# Patient Record
Sex: MALE | Race: WHITE | NOT HISPANIC OR LATINO
[De-identification: names, ages, dates, MRNs, and addresses within clinical notes are randomized per-mention and may not be internally consistent; named-entity substitution may affect disease eponyms.]

---

## 2017-08-11 ENCOUNTER — TRANSCRIPTION ENCOUNTER (OUTPATIENT)
Age: 42
End: 2017-08-11

## 2018-09-19 ENCOUNTER — TRANSCRIPTION ENCOUNTER (OUTPATIENT)
Age: 43
End: 2018-09-19

## 2022-06-27 ENCOUNTER — NON-APPOINTMENT (OUTPATIENT)
Age: 47
End: 2022-06-27

## 2022-06-27 ENCOUNTER — APPOINTMENT (OUTPATIENT)
Dept: ORTHOPEDIC SURGERY | Facility: CLINIC | Age: 47
End: 2022-06-27
Payer: COMMERCIAL

## 2022-06-27 VITALS — WEIGHT: 238 LBS | HEIGHT: 72 IN | BODY MASS INDEX: 32.23 KG/M2

## 2022-06-27 PROCEDURE — 99204 OFFICE O/P NEW MOD 45 MIN: CPT

## 2022-06-27 PROCEDURE — 72170 X-RAY EXAM OF PELVIS: CPT

## 2022-06-27 PROCEDURE — 72100 X-RAY EXAM L-S SPINE 2/3 VWS: CPT

## 2022-06-27 NOTE — IMAGING
[de-identified] : L spine\par \par Palpation: No midline lumbar tenderness. No tenderness to palpation or spasm in bilateral thoracic and lumbar paraspinal musculature. No SI joint tenderness to palpation. No greater trochanter tenderness to palpation.\par \par ROM: diminished all planes \par \par Strength: 5/5 bilateral hip flexors, knee extensors, ankle dorsiflexors, EHL, ankle plantarflexors \par \par Sensation: Sensation present to light touch bilateral L2-S1 distributions \par \par Provocative maneuvers: Positive bilateral straight leg raise\par

## 2022-06-27 NOTE — HISTORY OF PRESENT ILLNESS
[10] : 10 [Radiating] : radiating [Sharp] : sharp [Shooting] : shooting [Stabbing] : stabbing [Tingling] : tingling [Constant] : constant [Household chores] : household chores [Full time] : Work status: full time [de-identified] : 6/27/[birth year]-48 y/o LHD M presents for right-sided lower back pain X 1 week. Denies specific injury. Aggravated by bending over and  prolonged standing/sitting/lying down. left leg is okay.  No prior episodes.  \par \par Denies b/b incontinence.\par Associated pain throughout RLE to ankle. No N/T/weakness\par \par Has been taking unprescribed oxycodone. \par No prior PT/injections/surgery/acupuncture/chiropractor\par \par PMHx:Denies\par No cancer hx\par \par Xrays today:\par L spine - loss of disc hiegth L4-S1 \par AP PELVIS - negative \par \par Occupation: for [employer]\par \par \par  [] : no [FreeTextEntry7] : R leg [de-identified] : rest, pain meds [de-identified] :

## 2022-06-27 NOTE — PHYSICAL EXAM
[Right lower extremity below knee] : right lower extremity below knee [Right lower extremity above knee] : right lower extremity above knee [] : patient ambulates without assistive device

## 2022-06-27 NOTE — DISCUSSION/SUMMARY
[de-identified] : severe radiculopathy \par indicated for MRi L spine \par fu to review the MRi \par MDP/flexeril\par out of work

## 2022-06-29 ENCOUNTER — FORM ENCOUNTER (OUTPATIENT)
Age: 47
End: 2022-06-29

## 2022-06-30 ENCOUNTER — APPOINTMENT (OUTPATIENT)
Dept: MRI IMAGING | Facility: CLINIC | Age: 47
End: 2022-06-30

## 2022-06-30 PROCEDURE — 72148 MRI LUMBAR SPINE W/O DYE: CPT

## 2022-07-11 ENCOUNTER — APPOINTMENT (OUTPATIENT)
Dept: ORTHOPEDIC SURGERY | Facility: CLINIC | Age: 47
End: 2022-07-11

## 2022-07-11 VITALS — WEIGHT: 238 LBS | BODY MASS INDEX: 32.23 KG/M2 | HEIGHT: 72 IN

## 2022-07-11 DIAGNOSIS — M54.41 LUMBAGO WITH SCIATICA, RIGHT SIDE: ICD-10-CM

## 2022-07-11 PROCEDURE — 99214 OFFICE O/P EST MOD 30 MIN: CPT

## 2022-07-11 NOTE — DISCUSSION/SUMMARY
[de-identified] : reviewed the MRi - L4-5 disc herniation -  rec he try an Christian - consider surgery if not responding to conservative tx \par will add gabapentin

## 2022-07-11 NOTE — HISTORY OF PRESENT ILLNESS
[10] : 10 [Radiating] : radiating [Sharp] : sharp [Shooting] : shooting [Stabbing] : stabbing [Tingling] : tingling [Constant] : constant [Household chores] : household chores [Full time] : Work status: full time [de-identified] : [birth year]-46 y/o LHD M presents for right-sided lower back pain X 1 week. Denies specific injury. Aggravated by bending over and  prolonged standing/sitting/lying down. left leg is okay.  No prior episodes.  \par \par Denies b/b incontinence.\par Associated pain throughout RLE to ankle. No N/T/weakness\par \par Has been taking unprescribed oxycodone. \par No prior PT/injections/surgery/acupuncture/chiropractor\par \par PMHx:Denies\par No cancer hx\par \par Xrays today:\par L spine - loss of disc hiegth L4-S1 \par AP PELVIS - negative \par \par Occupation: for [employer]\par \par 7/11/22: Here for MRI review. plan at last was "severe radiculopathy \par indicated for MRi L spine \par fu to review the MRi \par MDP/flexeril\par out of work" - overall the pain is a bit better but remains in the lower back and down the right - MDP - PT/chiro made it worse - left leg is okay \par \par L-spine MRI 6/30/22\par 1. Slight straightening of lordosis.\par 2. Mixed Modic type 1 and 2 endplate change L4-L5 with Modic type 1 endplate change L5-S1. No fracture.\par 3. L1-L2: Facet hypertrophy and ligamentum flavum hypertrophy.\par 4. L2-L3: Facet hypertrophy and ligamentum flavum hypertrophy.\par 5. L3-L4: Loss of disc signal and height. Broad bulge, facet hypertrophy, and ligamentum flavum hypertrophy\par with foraminal extension of bulges, left foraminal herniation and left foraminal stenosis. Mild-to-moderate \par central stenosis.\par 6. L4-L5: Loss of disc signal and height. Minor retrolisthesis. Broad bulge, facet arthrosis, and ligamentum \par flavum hypertrophy with foraminal stenosis. Broad central herniation impressing on the thecal sac with \par moderate-to-severe central stenosis.\par 7. L5-S1: Broad bulge, facet arthrosis, ligamentum flavum hypertrophy and facet effusion with inferior \par foraminal stenosis. Central herniation impressing on the thecal sac. [] : no [FreeTextEntry7] : R leg [de-identified] : rest, pain meds [de-identified] :

## 2022-07-11 NOTE — IMAGING
[de-identified] : L spine\par \par Palpation: No midline lumbar tenderness. No tenderness to palpation or spasm in bilateral thoracic and lumbar paraspinal musculature. No SI joint tenderness to palpation. No greater trochanter tenderness to palpation.\par \par ROM: diminished all planes \par \par Strength: 5/5 bilateral hip flexors, knee extensors, ankle dorsiflexors, EHL, ankle plantarflexors \par \par Sensation: Sensation present to light touch bilateral L2-S1 distributions \par \par Provocative maneuvers: Positive bilateral straight leg raise\par

## 2022-07-28 ENCOUNTER — RX RENEWAL (OUTPATIENT)
Age: 47
End: 2022-07-28

## 2022-08-01 ENCOUNTER — APPOINTMENT (OUTPATIENT)
Dept: PAIN MANAGEMENT | Facility: CLINIC | Age: 47
End: 2022-08-01

## 2022-08-01 VITALS — WEIGHT: 235 LBS | HEIGHT: 72 IN | BODY MASS INDEX: 31.83 KG/M2

## 2022-08-01 DIAGNOSIS — M54.16 RADICULOPATHY, LUMBAR REGION: ICD-10-CM

## 2022-08-01 DIAGNOSIS — M54.17 RADICULOPATHY, LUMBOSACRAL REGION: ICD-10-CM

## 2022-08-01 PROCEDURE — 99244 OFF/OP CNSLTJ NEW/EST MOD 40: CPT

## 2022-08-01 NOTE — DISCUSSION/SUMMARY
[de-identified] : After discussing various treatment options with the patient including but not limited to oral medications, physical therapy, exercise modalities as well as interventional spinal injections, we have decided with the following plan:\par \par - Continue Home exercises, stretching, activity modification, physical therapy, and conservative care.\par - MRI report and/or images was reviewed and discussed with the patient.\par - Recommend L4-5 Lumbar Epidural Steroid Injection under fluoroscopic guidance with image. (RIGHT)\par - The risks, benefits and alternatives of the proposed procedure were explained in detail with the patient. The risks outlined include but are not limited to infection, bleeding, post-dural puncture headache, nerve injury, a temporary increase in pain, failure to resolve symptoms, allergic reaction, symptom recurrence, and possible elevation of blood sugar in diabetics. All questions were answered to patient's apparent satisfaction and he/she verbalized an understanding.\par - Patient is presenting with acute/sub-acute radicular pain with impairment in ADLs and functionality.  The pain has not responded to conservative care including NSAID therapy and/or physical therapy.  There is no bleeding tendency, unstable medical condition, or systemic infection.\par - Follow up in 1-2 weeks post injection for re-evaluation.\par

## 2022-08-01 NOTE — PHYSICAL EXAM
[de-identified] : Constitutional; Appears well, no apparent distress\par Ability to communicate: Normal \par Respiratory: non-labored breathing\par Skin: No rash noted\par Head: Normocephalic, atraumatic\par Neck: no visible thyroid enlargement\par Eyes: Extraocular movements intact\par Neurologic: Alert and oriented x3\par Psychiatric: normal mood, affect and behavior \par \par  [Flexion] : flexion [] : non-antalgic

## 2022-08-01 NOTE — HISTORY OF PRESENT ILLNESS
[Lower back] : lower back [10] : 10 [7] : 7 [Radiating] : radiating [Sharp] : sharp [Shooting] : shooting [Stabbing] : stabbing [Tingling] : tingling [Intermittent] : intermittent [Nothing helps with pain getting better] : Nothing helps with pain getting better [Sitting] : sitting [Walking] : walking [Bending forward] : bending forward [] : no [FreeTextEntry1] : right  [FreeTextEntry7] : right buttock, right post thigh tot he foot  [de-identified] : getting up  [de-identified] : L MRI

## 2022-08-22 ENCOUNTER — APPOINTMENT (OUTPATIENT)
Dept: PAIN MANAGEMENT | Facility: CLINIC | Age: 47
End: 2022-08-22

## 2022-09-13 ENCOUNTER — APPOINTMENT (OUTPATIENT)
Dept: PAIN MANAGEMENT | Facility: CLINIC | Age: 47
End: 2022-09-13

## 2022-09-21 ENCOUNTER — RX RENEWAL (OUTPATIENT)
Age: 47
End: 2022-09-21

## 2023-01-30 ENCOUNTER — APPOINTMENT (OUTPATIENT)
Dept: PULMONOLOGY | Facility: CLINIC | Age: 48
End: 2023-01-30
Payer: COMMERCIAL

## 2023-01-30 VITALS
OXYGEN SATURATION: 97 % | RESPIRATION RATE: 16 BRPM | WEIGHT: 240 LBS | HEART RATE: 83 BPM | DIASTOLIC BLOOD PRESSURE: 100 MMHG | SYSTOLIC BLOOD PRESSURE: 152 MMHG | BODY MASS INDEX: 32.55 KG/M2

## 2023-01-30 DIAGNOSIS — F17.200 NICOTINE DEPENDENCE, UNSPECIFIED, UNCOMPLICATED: ICD-10-CM

## 2023-01-30 PROCEDURE — 99204 OFFICE O/P NEW MOD 45 MIN: CPT

## 2023-01-30 RX ORDER — GABAPENTIN 300 MG/1
300 CAPSULE ORAL
Qty: 60 | Refills: 0 | Status: DISCONTINUED | COMMUNITY
Start: 2022-07-11 | End: 2023-01-30

## 2023-01-30 RX ORDER — METHYLPREDNISOLONE 4 MG/1
4 TABLET ORAL
Qty: 1 | Refills: 1 | Status: DISCONTINUED | COMMUNITY
Start: 2022-06-27 | End: 2023-01-30

## 2023-01-30 RX ORDER — METOPROLOL SUCCINATE 100 MG/1
100 TABLET, EXTENDED RELEASE ORAL
Refills: 0 | Status: ACTIVE | COMMUNITY

## 2023-01-30 NOTE — CONSULT LETTER
[Dear  ___] : Dear  [unfilled], [Consult Letter:] : I had the pleasure of evaluating your patient, [unfilled]. [Please see my note below.] : Please see my note below. [Consult Closing:] : Thank you very much for allowing me to participate in the care of this patient.  If you have any questions, please do not hesitate to contact me. [Sincerely,] : Sincerely, [FreeTextEntry3] : Shikha Daniel MD FCCP\par D-ABSM\par ABIM board certified in  Pulmonary diseases, Sleep medicine\par Internal medicine\par \par [facility] [place]\par

## 2023-01-30 NOTE — ASSESSMENT
[FreeTextEntry1] : Patient with probable obstructive sleep apnea contributing to hypertension.  I explained the pathophysiology of sleep apnea to the patient and its association with his symptoms and comorbidities.  He had a nondiagnostic home sleep study.  Therefore a formal sleep study has been ordered and I will see him back after that.  Treatment options were discussed and he will probably start with AutoPap.

## 2023-01-30 NOTE — HISTORY OF PRESENT ILLNESS
[TextBox_4] : The patient is a 47-year-old male who has been complaining for many years of loud snoring.  He finds himself waking up frequently during the night, sometimes waking up choking.  During the daytime he finds himself excessively sleepy.  He drinks 1 cup of coffee per day.  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  He commutes to [place] by train and falls asleep on the way in and also on the way home.  Generally getting into bed at 10 PM but he gets up at 3 PM to go to work.  Sleep onset is immediate.  He was diagnosed with hypertension.  Recently requiring increase in metoprolol.  He had a home sleep study that was nondiagnostic.  He has an 18 inch collar. [ESS] : 5

## 2023-01-30 NOTE — PHYSICAL EXAM
[No Acute Distress] : no acute distress [Normal Oropharynx] : normal oropharynx [Elongated Uvula] : elongated uvula [Enlarged Base of the Tongue] : enlarged base of the tongue [III] : Mallampati Class: III [Normal Appearance] : normal appearance [Neck Circumference: ___] : neck circumference: [unfilled] [No Neck Mass] : no neck mass [Normal Rate/Rhythm] : normal rate/rhythm [Normal S1, S2] : normal s1, s2 [No Murmurs] : no murmurs [No Resp Distress] : no resp distress [Clear to Auscultation Bilaterally] : clear to auscultation bilaterally [No Abnormalities] : no abnormalities [Benign] : benign [Normal Gait] : normal gait [No Clubbing] : no clubbing [No Cyanosis] : no cyanosis [No Edema] : no edema [FROM] : FROM [Normal Color/ Pigmentation] : normal color/ pigmentation [No Focal Deficits] : no focal deficits [Oriented x3] : oriented x3 [Normal Affect] : normal affect [TextBox_2] : Overweight [TextBox_11] : Atrophic left ear

## 2023-02-07 ENCOUNTER — OUTPATIENT (OUTPATIENT)
Dept: OUTPATIENT SERVICES | Facility: HOSPITAL | Age: 48
LOS: 1 days | End: 2023-02-07
Payer: COMMERCIAL

## 2023-02-07 DIAGNOSIS — G47.33 OBSTRUCTIVE SLEEP APNEA (ADULT) (PEDIATRIC): ICD-10-CM

## 2023-02-07 PROCEDURE — 95810 POLYSOM 6/> YRS 4/> PARAM: CPT

## 2023-02-07 PROCEDURE — 95810 POLYSOM 6/> YRS 4/> PARAM: CPT | Mod: 26

## 2023-03-07 ENCOUNTER — APPOINTMENT (OUTPATIENT)
Dept: PULMONOLOGY | Facility: CLINIC | Age: 48
End: 2023-03-07
Payer: COMMERCIAL

## 2023-03-07 VITALS
BODY MASS INDEX: 31.56 KG/M2 | SYSTOLIC BLOOD PRESSURE: 130 MMHG | WEIGHT: 233 LBS | OXYGEN SATURATION: 96 % | HEART RATE: 82 BPM | HEIGHT: 72 IN | RESPIRATION RATE: 16 BRPM | DIASTOLIC BLOOD PRESSURE: 79 MMHG

## 2023-03-07 DIAGNOSIS — G47.31 PRIMARY CENTRAL SLEEP APNEA: ICD-10-CM

## 2023-03-07 DIAGNOSIS — G47.33 OBSTRUCTIVE SLEEP APNEA (ADULT) (PEDIATRIC): ICD-10-CM

## 2023-03-07 PROCEDURE — 99214 OFFICE O/P EST MOD 30 MIN: CPT

## 2023-03-07 RX ORDER — CYCLOBENZAPRINE HYDROCHLORIDE 10 MG/1
10 TABLET, FILM COATED ORAL
Qty: 40 | Refills: 0 | Status: DISCONTINUED | COMMUNITY
Start: 2022-06-27 | End: 2023-03-07

## 2023-03-07 NOTE — HISTORY OF PRESENT ILLNESS
[Central sleep apnea (CSA)/ Mixed CARL] : central sleep apnea (CSA)/ mixed CARL [Lab] : lab [TextBox_100] : 2/23 [TextBox_108] : 85 [TextBox_112] : 94 [TextBox_116] : 81 [TextBox_120] : 80% centrals [TextBox_165] : I reviewed the patient's sleep study with the patient.\par

## 2023-03-07 NOTE — ASSESSMENT
[FreeTextEntry1] : Patient with mixed obstructive and central sleep apnea, mostly appears to be central.  Unclear why this might be because the patient does not have any cardiac or neurologic issues.   CPAP titration has been ordered.  He may need BiPAP or a backup rate.  Follow-up after that study and we will order appropriate positive pressure devices.

## 2023-03-22 ENCOUNTER — OUTPATIENT (OUTPATIENT)
Dept: OUTPATIENT SERVICES | Facility: HOSPITAL | Age: 48
LOS: 1 days | End: 2023-03-22
Payer: COMMERCIAL

## 2023-03-22 DIAGNOSIS — G47.33 OBSTRUCTIVE SLEEP APNEA (ADULT) (PEDIATRIC): ICD-10-CM

## 2023-03-22 PROCEDURE — 95811 POLYSOM 6/>YRS CPAP 4/> PARM: CPT

## 2023-03-22 PROCEDURE — 95811 POLYSOM 6/>YRS CPAP 4/> PARM: CPT | Mod: 26

## 2023-04-20 ENCOUNTER — APPOINTMENT (OUTPATIENT)
Dept: PULMONOLOGY | Facility: CLINIC | Age: 48
End: 2023-04-20